# Patient Record
Sex: FEMALE | Race: WHITE | NOT HISPANIC OR LATINO | URBAN - METROPOLITAN AREA
[De-identification: names, ages, dates, MRNs, and addresses within clinical notes are randomized per-mention and may not be internally consistent; named-entity substitution may affect disease eponyms.]

---

## 2023-12-04 ENCOUNTER — OFFICE VISIT (OUTPATIENT)
Dept: URGENT CARE | Facility: CLINIC | Age: 58
End: 2023-12-04

## 2023-12-04 VITALS
SYSTOLIC BLOOD PRESSURE: 148 MMHG | WEIGHT: 145 LBS | HEART RATE: 96 BPM | OXYGEN SATURATION: 98 % | HEIGHT: 63 IN | TEMPERATURE: 98.4 F | RESPIRATION RATE: 20 BRPM | DIASTOLIC BLOOD PRESSURE: 88 MMHG | BODY MASS INDEX: 25.69 KG/M2

## 2023-12-04 DIAGNOSIS — R51.9 ACUTE NONINTRACTABLE HEADACHE, UNSPECIFIED HEADACHE TYPE: ICD-10-CM

## 2023-12-04 PROCEDURE — 99203 OFFICE O/P NEW LOW 30 MIN: CPT | Performed by: NURSE PRACTITIONER

## 2023-12-04 PROCEDURE — 3077F SYST BP >= 140 MM HG: CPT | Performed by: NURSE PRACTITIONER

## 2023-12-04 PROCEDURE — 3079F DIAST BP 80-89 MM HG: CPT | Performed by: NURSE PRACTITIONER

## 2023-12-04 RX ORDER — ZIPRASIDONE HYDROCHLORIDE 60 MG/1
CAPSULE ORAL
COMMUNITY
Start: 2023-11-10

## 2023-12-04 RX ORDER — KETOROLAC TROMETHAMINE 30 MG/ML
30 INJECTION, SOLUTION INTRAMUSCULAR; INTRAVENOUS ONCE
Status: COMPLETED | OUTPATIENT
Start: 2023-12-04 | End: 2023-12-04

## 2023-12-04 RX ADMIN — KETOROLAC TROMETHAMINE 30 MG: 30 INJECTION, SOLUTION INTRAMUSCULAR; INTRAVENOUS at 15:42

## 2023-12-04 NOTE — LETTER
December 4, 2023    To Whom It May Concern:         This is confirmation that Ephraim Hernandes attended her scheduled appointment with KELLEN Ritchie on 12/04/23. Please excuse her from work for today 12/04/23. May return back to work tomorrow 12/05/23.          If you have any questions please do not hesitate to call me at the phone number listed below.    Sincerely,      YOKO RitchieN.    [phone]

## 2023-12-04 NOTE — PROGRESS NOTES
Ephraim Hernandes is a 58 y.o. female who presents for Migraine (X1.5 days, new to [place]: from [place], back of head pain ) and Letter for School/Work    Accompanied by her sister.  HPI  This is a new problem. Ephraim Hernandes is a 58 y.o. patient who presents to urgent care with c/o: Complaints of 12/10 migraine.  She has a history of migraines.  This migraine has been present for a day and a half.  She just moved to [place] from [place] to be be near her family.  She reports that she used to take Imitrex a long time ago.  She thinks the change in elevation and weather have triggered this migraine for her.  She has taken some Tylenol without relief.  She is not taking any medications today.  She does use ice pack to her face which helps.  She denies rhinitis, sinus pain, recent illness.      ROS See HPI  Headache red flags  -Rapid increase in headache frequency: no  -Lack of coordination: no  -Localized neurological signs or papilledema: no  -Headaches that awake patient from sleep: no  -Worse headache of their life: no  -New onset of headache over the age of 50 years old: no  -Rapid onset of headache with strenuous exercise: no  -Current diagnosis of cancer, HIV, or Lyme disease: no      Allergies:     No Known Allergies    PMSFS Hx:  History reviewed. No pertinent past medical history.  History reviewed. No pertinent surgical history.  History reviewed. No pertinent family history.  Social History     Tobacco Use    Smoking status: Former     Types: Cigarettes    Smokeless tobacco: Never   Substance Use Topics    Alcohol use: Not Currently       Problems:   There is no problem list on file for this patient.      Medications:   Current Outpatient Medications on File Prior to Visit   Medication Sig Dispense Refill    ziprasidone (GEODON) 60 MG Cap TAKE 1 CAPSULE BY MOUTH TWICE DAILY FOR MOOD WITH 500 CALORIE MEAL AT 5PM AND 1 CAPSULE AT BEDTIME WITH LARGER MEAL (Patient not taking: Reported on 12/4/2023)       No current  "facility-administered medications on file prior to visit.        Objective:     BP (!) 148/88 (BP Location: Left arm, Patient Position: Sitting, BP Cuff Size: Adult)   Pulse 96   Temp 36.9 °C (98.4 °F) (Temporal)   Resp 20   Ht 1.6 m (5' 3\")   Wt 65.8 kg (145 lb)   SpO2 98%   BMI 25.69 kg/m²     Physical Exam    Assessment /Associated Orders:      1. Acute nonintractable headache, unspecified headache type  ketorolac (Toradol) injection 30 mg          Medical Decision Making:    Ephraim is a very pleasant 58 y.o. female who is clinically stable at today's acute urgent care visit.  No acute distress noted.  VSS. Appropriate for outpatient care at this time.   Acute problem today with uncertain prognosis.   Toradol given in clinic today. Tolerated well without adverse effects. Advised not to take NSAIDS for 6-8 hours post injection.   OTC  analgesic of choice (acetaminophen or NSAID) prn pain. Follow manufactures dosing and safety precautions.   Lifestyle modifications:  Recognize and avoid HA triggers: chocolate, ETOH, MSG, artificial sweeteners, aged cheese, stress, too much or too little sleep, altered routines, skipping meals/ hunger and others).   Encourage regular exercise  Keep well hydrated  Good posture  Uses tinted lenses to minimize glare and bright lights.   OTC Analgesics: Nsaids, acetaminophen, others. Limit use to 2 days of treatment per week to avoid analgesic rebound headache   Schedule FU with PCP to establish care here in [place]  Discussed Dx, management options (risks,benefits, and alternatives to planned treatment), natural progression and supportive care.  Expressed understanding and the treatment plan was agreed upon.   Questions were encouraged and answered   Return to urgent care prn if new or worsening sx or if there is no improvement in condition prn.    Educated in Red flags and indications to immediately call 911 or present to the Emergency Department.           Please note that this " dictation was created using voice recognition software. I have worked with consultants from the vendor as well as technical experts from [facility] to optimize the interface. I have made every reasonable attempt to correct obvious errors, but I expect that there are errors of grammar and possibly content that I did not discover before finalizing the note.  This note was electronically signed by provider